# Patient Record
Sex: FEMALE | Race: WHITE
[De-identification: names, ages, dates, MRNs, and addresses within clinical notes are randomized per-mention and may not be internally consistent; named-entity substitution may affect disease eponyms.]

---

## 2020-01-07 ENCOUNTER — HOSPITAL ENCOUNTER (EMERGENCY)
Dept: HOSPITAL 1 - ED | Age: 20
Discharge: HOME | End: 2020-01-07
Payer: SELF-PAY

## 2020-01-07 VITALS
HEIGHT: 64 IN | WEIGHT: 176 LBS | BODY MASS INDEX: 30.05 KG/M2 | HEIGHT: 64 IN | BODY MASS INDEX: 30.05 KG/M2 | WEIGHT: 176 LBS

## 2020-01-07 VITALS — DIASTOLIC BLOOD PRESSURE: 90 MMHG | SYSTOLIC BLOOD PRESSURE: 132 MMHG

## 2020-01-07 DIAGNOSIS — L03.115: ICD-10-CM

## 2020-01-07 DIAGNOSIS — E11.65: Primary | ICD-10-CM

## 2020-01-07 LAB
ALBUMIN SERPL-MCNC: 2.9 G/DL (ref 3.4–5)
ALP SERPL-CCNC: 109 U/L (ref 46–116)
ALT SERPL-CCNC: 26 U/L (ref 14–59)
AST SERPL-CCNC: 14 U/L (ref 15–37)
BASOPHILS NFR BLD: 0.2 % (ref 0–2)
BILIRUB SERPL-MCNC: 0.17 MG/DL (ref 0.2–1)
BUN SERPL-MCNC: 8 MG/DL (ref 7–18)
CALCIUM SERPL-MCNC: 9.5 MG/DL (ref 8.5–10.1)
CHLORIDE SERPL-SCNC: 91 MMOL/L (ref 98–107)
CO2 SERPL-SCNC: 25.7 MMOL/L (ref 21–32)
CREAT SERPL-MCNC: 0.6 MG/DL (ref 0.6–1)
ERYTHROCYTE [DISTWIDTH] IN BLOOD BY AUTOMATED COUNT: 13.4 % (ref 11.5–14.5)
GFR SERPLBLD BASED ON 1.73 SQ M-ARVRAT: > 60 ML/MIN
GLUCOSE SERPL-MCNC: 524 MG/DL (ref 74–106)
PLATELET # BLD: 317 X10^3MCL (ref 130–400)
POTASSIUM SERPL-SCNC: 4.1 MMOL/L (ref 3.5–5.1)
PROT SERPL-MCNC: 8.1 G/DL (ref 6.4–8.2)
SODIUM SERPL-SCNC: 127 MMOL/L (ref 136–145)

## 2020-01-10 ENCOUNTER — HOSPITAL ENCOUNTER (INPATIENT)
Dept: HOSPITAL 1 - ED | Age: 20
LOS: 4 days | Discharge: HOME | DRG: 380 | End: 2020-01-14
Attending: FAMILY MEDICINE | Admitting: FAMILY MEDICINE
Payer: MEDICAID

## 2020-01-10 VITALS
BODY MASS INDEX: 30.27 KG/M2 | HEIGHT: 64 IN | BODY MASS INDEX: 30.27 KG/M2 | WEIGHT: 177.31 LBS | WEIGHT: 177.31 LBS | HEIGHT: 64 IN

## 2020-01-10 VITALS — SYSTOLIC BLOOD PRESSURE: 124 MMHG | DIASTOLIC BLOOD PRESSURE: 84 MMHG

## 2020-01-10 DIAGNOSIS — D68.69: ICD-10-CM

## 2020-01-10 DIAGNOSIS — L02.611: ICD-10-CM

## 2020-01-10 DIAGNOSIS — E11.65: ICD-10-CM

## 2020-01-10 DIAGNOSIS — Z91.14: ICD-10-CM

## 2020-01-10 DIAGNOSIS — E87.1: ICD-10-CM

## 2020-01-10 DIAGNOSIS — E87.8: ICD-10-CM

## 2020-01-10 DIAGNOSIS — E86.0: ICD-10-CM

## 2020-01-10 DIAGNOSIS — Z79.84: ICD-10-CM

## 2020-01-10 DIAGNOSIS — L97.415: ICD-10-CM

## 2020-01-10 DIAGNOSIS — E78.5: ICD-10-CM

## 2020-01-10 DIAGNOSIS — E11.621: Primary | ICD-10-CM

## 2020-01-10 LAB
BASOPHILS NFR BLD: 0.3 % (ref 0–2)
BUN SERPL-MCNC: 12 MG/DL (ref 7–18)
CALCIUM SERPL-MCNC: 8.9 MG/DL (ref 8.5–10.1)
CHLORIDE SERPL-SCNC: 95 MMOL/L (ref 98–107)
CHOLEST SERPL-MCNC: 232 MG/DL (ref ?–200)
CHOLEST/HDLC SERPL: 6.3 MG/DL
CO2 SERPL-SCNC: 24.9 MMOL/L (ref 21–32)
CREAT SERPL-MCNC: 0.6 MG/DL (ref 0.6–1)
ERYTHROCYTE [DISTWIDTH] IN BLOOD BY AUTOMATED COUNT: 12.9 % (ref 11.5–14.5)
GFR SERPLBLD BASED ON 1.73 SQ M-ARVRAT: > 60 ML/MIN
GLUCOSE SERPL-MCNC: 433 MG/DL (ref 74–106)
HDLC SERPL-MCNC: 37 MG/DL (ref 40–60)
MAGNESIUM SERPL-MCNC: 2 MG/DL (ref 1.8–2.4)
PHOSPHATE SERPL-MCNC: 4.4 MG/DL (ref 2.5–4.9)
PLATELET # BLD: 285 X10^3MCL (ref 130–400)
POTASSIUM SERPL-SCNC: 4 MMOL/L (ref 3.5–5.1)
SODIUM SERPL-SCNC: 134 MMOL/L (ref 136–145)
TRIGL SERPL-MCNC: 290 MG/DL (ref ?–150)

## 2020-01-10 PROCEDURE — G0378 HOSPITAL OBSERVATION PER HR: HCPCS

## 2020-01-10 NOTE — NUR
RECEIVED CALL FROM RT FOR PT CXR RESULTS. CXR SHOWING PROMINENT LUCENCY
AROUND THE HEART, WHICH MAY BE ARTIFACT VS PNEUMOMEDIASTINUM, AND RECCOMENDING
CT FOR CLARIFICATION.  RESULTS REPORTED TO DR HUSSEIN. NO NEW ORDERS RECEIVED.
WILL CONT TO MONITOR.

## 2020-01-10 NOTE — NUR
RECEIVED FROM ER, TRANSPORTED VIA [facility]. AWAKE AND ALERT, ORIENTED TO NAME,
PLACE, TIME AND SITUATION. SPEECH CLEAR AND APPROPRIATE. BREATHING EVEN AND
UNLABORED ON ROOM AIR. ABLE TO AMBULATE INTO ROOM, BUT WITH ALTERED MOBILITY
DUE TO SWELLING TO RIGHT FOOT. CRUTCHES WITH PT ON ADMISSION TO UNIT. AREA OF
2.5 X 2 CM AREA OF WHITE SKIN TO PLANTAR AREA OF RIGHT FOOT JUST BELOW 2ND AND
3RD TOES WITH SURROUNDING SKIN RED IN COLOR. +1 EDEMA TO RIGHT FOOT. DENIES
HAVING PAIN AT THIS TIME. PHOTODOCUMENTATION DONE. SALINE LOCK TO LEFT AC.
INSTRUCTED ON USE OF CALL LIGHT TO CALL FOR ASSISTANCE, PLACED WITHIN EASY
REACH.

## 2020-01-10 NOTE — NUR
PT BIB SELF FOR C/O RT FOOT PAAIN. PER PT SHE WAS SEEN IN THE ED THREE DAYS 
AGO FOR A GOOT INFECTION. THE ABCEES WAS DRAINED AND THE PT WAS GIEN AND RX FOR
KEFLEX. PT STS THAT SHE DIDNT HAVE THE MONEY FOR THE RX AND DID NOT TAKE IT
UNTIL YESTERDAAY. PT REPORTS THAT SHE WOKE UP WITH HER RT FOOT LOOKING "
WORSE". SWELLING AND ECCYMOSIS NOTED TO THE RIIGHT FOOT. +2 PTTING EDEMA NOTED.
PT REPORT THAT SHE IS DIABETIC AND HAS NOT BEEN COMPLIANNT WITH HER INSULIKN
AND METFORMN FOR "MONTHS". PT IS A/O X4. PTRESPS ARE E/U. PT PLACED ON MONITOR.
SINUS TACH ON MONITOR

## 2020-01-10 NOTE — NUR
PT MEDICATED PER EMAR ORDERS. PLEASE SEE EMAR. PT IS A/O X4. PT RESPS ARE E/U.
PT DENIES ANY PAIN AT THIS TIME. NO ACDC NOTED

## 2020-01-11 VITALS — DIASTOLIC BLOOD PRESSURE: 86 MMHG | SYSTOLIC BLOOD PRESSURE: 123 MMHG

## 2020-01-11 VITALS — SYSTOLIC BLOOD PRESSURE: 116 MMHG | DIASTOLIC BLOOD PRESSURE: 87 MMHG

## 2020-01-11 VITALS — DIASTOLIC BLOOD PRESSURE: 82 MMHG | SYSTOLIC BLOOD PRESSURE: 119 MMHG

## 2020-01-11 VITALS — SYSTOLIC BLOOD PRESSURE: 118 MMHG | DIASTOLIC BLOOD PRESSURE: 73 MMHG

## 2020-01-11 VITALS — DIASTOLIC BLOOD PRESSURE: 81 MMHG | SYSTOLIC BLOOD PRESSURE: 132 MMHG

## 2020-01-11 LAB
AMPHETAMINES UR QL SCN: (no result)
BASOPHILS NFR BLD: 0.2 % (ref 0–2)
BUN SERPL-MCNC: 8 MG/DL (ref 7–18)
CALCIUM SERPL-MCNC: 8.7 MG/DL (ref 8.5–10.1)
CHLORIDE SERPL-SCNC: 95 MMOL/L (ref 98–107)
CO2 SERPL-SCNC: 23.7 MMOL/L (ref 21–32)
CREAT SERPL-MCNC: 0.5 MG/DL (ref 0.6–1)
ERYTHROCYTE [DISTWIDTH] IN BLOOD BY AUTOMATED COUNT: 13.3 % (ref 11.5–14.5)
GFR SERPLBLD BASED ON 1.73 SQ M-ARVRAT: > 60 ML/MIN
GLUCOSE SERPL-MCNC: 291 MG/DL (ref 74–106)
MAGNESIUM SERPL-MCNC: 1.8 MG/DL (ref 1.8–2.4)
MICROSCOPIC UR-IMP: YES
PHOSPHATE SERPL-MCNC: 4.1 MG/DL (ref 2.5–4.9)
PLATELET # BLD: 258 X10^3MCL (ref 130–400)
POTASSIUM SERPL-SCNC: 4.2 MMOL/L (ref 3.5–5.1)
RBC # UR STRIP.AUTO: (no result) /UL
SODIUM SERPL-SCNC: 129 MMOL/L (ref 136–145)
T3 SERPL-MCNC: 0.58 NG/ML
T3RU NFR SERPL: 38 % UPTAKE (ref 30–39)
T4 FREE SERPL-MCNC: 1.26 NG/DL (ref 0.76–1.46)
T4 SERPL-MCNC: 7 UG/DL (ref 4.7–13.3)
T4/T3 UPTAKE INDEX SERPL: 2.7 UG/DL (ref 1.4–4.5)
UA SPECIFIC GRAVITY: 1.02 (ref 1–1.03)

## 2020-01-11 PROCEDURE — 0JBQ0ZZ EXCISION OF RIGHT FOOT SUBCUTANEOUS TISSUE AND FASCIA, OPEN APPROACH: ICD-10-PCS | Performed by: PODIATRIST

## 2020-01-11 NOTE — NUR
SEEN IN BED AAOX4. NO RESP DISTRESS, BREATHING E/U ON ROOM AIR. DRSG TO RIGHT
FOOT WRAPPED WITH ACE, CDI, ELEVATED ON PILLOWS. STATED PAIN IS TOLERABLE. ON
CCHO DIET. S/L TO LFA INTACT AND PATENT. ENCOURAGED TO AMBULATE USING CRUTCHES
WITH RIGHT HEEL WEIGHT BEARING WITH POST-OP SHOE. PATIENT VERBALIZED
UNDERSTANDING. CALL LIGHT PLACED WITHIN EASY REACH. SIDERAILS UP X2.

## 2020-01-11 NOTE — NUR
PT SLEPT AT INTERVALS THROUGHOUT THE NIGHT, BREATHING EVEN AND UNLABORED ON
RA. NO SIGNIFICANT CHANGE DURING SHIFT. NO C/O PAIN. ALL NEEDS ASSESSED AND
ATTENDED TO. NO ACUTE DISTRESS NOTED.  BED AT LOWEST SETTING. SIDE RAILS X2
UP. CALL LIGHT WITHING REACH. WILL CONT TO MONITOR AND ENDORSE CARE TO AM
NURSE.

## 2020-01-11 NOTE — NUR
PT HAD I AND D PER DR GONZALEZ, CONSENT WAS SIGNED AND PUT IN CHART. PT
TOLERATED PROCEDURE WELL. STATES "I FEEL MUCH BETTER". NO ACUTE DISTRESS
NOTED.  PICTURE AFTER PROCEDURE TAKEN AND PUT ON CHART. SAFETY PRECAUTIONS IN
PLACE. WILL ENDORSE CARE TO AM NURSE.

## 2020-01-11 NOTE — NUR
awake and alert, using her cellphone, breathing even and unlabored on room
air. call light within easy reach. saline lock to left forearm flushed well,
due zosyn ivpb administered.

## 2020-01-11 NOTE — NUR
NORCO 1 TAB PO GIVEN FOR RIGHT FOOT INCISION SITE PAIN, WILL CONTINUE TO
MONITOR. KEPT RLE ELEVATED ON 2 PILLOWS.

## 2020-01-11 NOTE — NUR
AWAKE AND ALERT, ORIENTED TO NAME, PLACE, TIME AND SITUATION. SPEECH CLEAR AND
APPROPRIATE. BREATHING EVEN AND UNLABORED ON ROOM AIR. DENIES HAVING PAIN TO
RIGHT FOOT AT THIS TIME. AMBULATORY WITH POST OP BOOT AND CRUTCHES. DRESSING
TO RIGHT FOOT CDI. ELEVATED ON 2 PILLOWS. CALL LIGHT WITHIN EASY REACH.

## 2020-01-12 VITALS — DIASTOLIC BLOOD PRESSURE: 80 MMHG | SYSTOLIC BLOOD PRESSURE: 127 MMHG

## 2020-01-12 VITALS — DIASTOLIC BLOOD PRESSURE: 88 MMHG | SYSTOLIC BLOOD PRESSURE: 131 MMHG

## 2020-01-12 VITALS — SYSTOLIC BLOOD PRESSURE: 129 MMHG | DIASTOLIC BLOOD PRESSURE: 88 MMHG

## 2020-01-12 VITALS — DIASTOLIC BLOOD PRESSURE: 83 MMHG | SYSTOLIC BLOOD PRESSURE: 127 MMHG

## 2020-01-12 VITALS — DIASTOLIC BLOOD PRESSURE: 81 MMHG | SYSTOLIC BLOOD PRESSURE: 130 MMHG

## 2020-01-12 LAB
BASOPHILS NFR BLD: 0.3 % (ref 0–2)
BUN SERPL-MCNC: 9 MG/DL (ref 7–18)
CALCIUM SERPL-MCNC: 8.3 MG/DL (ref 8.5–10.1)
CHLORIDE SERPL-SCNC: 102 MMOL/L (ref 98–107)
CO2 SERPL-SCNC: 24.5 MMOL/L (ref 21–32)
CREAT SERPL-MCNC: 0.5 MG/DL (ref 0.6–1)
ERYTHROCYTE [DISTWIDTH] IN BLOOD BY AUTOMATED COUNT: 13 % (ref 11.5–14.5)
GFR SERPLBLD BASED ON 1.73 SQ M-ARVRAT: > 60 ML/MIN
GLUCOSE SERPL-MCNC: 264 MG/DL (ref 74–106)
MAGNESIUM SERPL-MCNC: 1.7 MG/DL (ref 1.8–2.4)
PHOSPHATE SERPL-MCNC: 4.3 MG/DL (ref 2.5–4.9)
PLATELET # BLD: 274 X10^3MCL (ref 130–400)
POTASSIUM SERPL-SCNC: 4.2 MMOL/L (ref 3.5–5.1)
SODIUM SERPL-SCNC: 138 MMOL/L (ref 136–145)

## 2020-01-12 PROCEDURE — 0JBQ0ZZ EXCISION OF RIGHT FOOT SUBCUTANEOUS TISSUE AND FASCIA, OPEN APPROACH: ICD-10-PCS | Performed by: PODIATRIST

## 2020-01-12 NOTE — NUR
AWAKE AND ALERT, WATCHING TV. BREATHING EVEN AND UNLABORED ON ROOM AIR. SALINE
LOCKED. DENIES HAVING PAIN. RIGHT FOOT ELEVATED ON 2PILLOWS. CALL LIGHT WITHIN
EASY REACH. OFF ISOLATION.

## 2020-01-12 NOTE — NUR
PT BACK FROM OR. NO ACUTE DISTRESS. AAOX4. RESP EVEN AND UNLABORED ON RA.
DENIES PAIN AT THIS TIME. DRESSING TO RIGHT FOOT C/D/I. RLE ELEVATED WITH
PILLOW. IV TO LFA WITH NO REDNESS OR SWELLING. HOB ELEVATED. CALL LIGHT WITHIN
REACH. FAMILY AT BEDSIDE. WILL CONTINUE TO MONITOR.

## 2020-01-12 NOTE — NUR
RECEIVED PT IN NO ACUTE DISTRESS. RESTING IN BED. AAOX4. GETTING DRESSING
CHANGE TO RIGHT FOOT BY PODIATRY. DENIES PAIN. IV TO LFA, NO REDNESS OR
SWELLING NOTED. CRUTCHES AND POST-OP SHOE AT BEDSIDE. BED IN LOW POSITION,
CALL LIGHT WITHIN REACH. WILL CONTINUE TO MONITOR.

## 2020-01-12 NOTE — NUR
AWAKE AND ALERT, DRESSING TO RIGHT FOOT WAS REMOVED BY PODIATRIST. IN NO ACUTE
DISTRESS. ENDORSED TO NURSE KHAN

## 2020-01-12 NOTE — NUR
EYES CLOSED, EASILY AWAKENED. DENIES HAVING PAIN. BREATHING EVEN AND UNLABORED
ON ROOM AIR. KEPT ON CONTACT ISOLATION. DRESSING TO RIGHT FOOT CDI. KEPT
ELEVATED ON PILLOWS.

## 2020-01-12 NOTE — NUR
PT SITTING UP IN BED. NO ACUTE DISTRESS. AAOX4. RESP EVEN AND UNLABORED ON RA.
EATING DINNER. IV TO LFA, NO REDNESS OR SWELLING NOTED. DRESSING TO R FOOT
C/D/I, RLE ELEVATED WITH PILLOW. CRUTCHES AND POST OP SHOE AT BEDSIDE. PT NOW
ON STANDARD PRECAUTIONS. BED IN LOW POSITION, CALL LIGHT WITHIN REACH. WILL
ENDORSE TO ONCOMING SHIFT.

## 2020-01-12 NOTE — NUR
DR. CHATTERJEE SPOKE WITH PT REGARDING PROCEDURE, RISKS AND BENEFITS.
INFORMED CONSENT SIGNED AND PLACED IN CHART. PRE-OP PREP DONE. PT MARKED
SURGICAL SITE WITH SURGICAL MARKER. PT REPORTS LAST DRINK OF WATER AROUND
6 AM. PT INFORMED OF NPO STATUS, VERBALIZED UNDERSTANDING. WILL CONTINUE TO
MONITOR.

## 2020-01-13 VITALS — SYSTOLIC BLOOD PRESSURE: 120 MMHG | DIASTOLIC BLOOD PRESSURE: 84 MMHG

## 2020-01-13 VITALS — SYSTOLIC BLOOD PRESSURE: 131 MMHG | DIASTOLIC BLOOD PRESSURE: 78 MMHG

## 2020-01-13 VITALS — DIASTOLIC BLOOD PRESSURE: 87 MMHG | SYSTOLIC BLOOD PRESSURE: 126 MMHG

## 2020-01-13 VITALS — DIASTOLIC BLOOD PRESSURE: 76 MMHG | SYSTOLIC BLOOD PRESSURE: 116 MMHG

## 2020-01-13 VITALS — DIASTOLIC BLOOD PRESSURE: 81 MMHG | SYSTOLIC BLOOD PRESSURE: 116 MMHG

## 2020-01-13 LAB
BASOPHILS NFR BLD: 0.4 % (ref 0–2)
BUN SERPL-MCNC: 8 MG/DL (ref 7–18)
CALCIUM SERPL-MCNC: 8.6 MG/DL (ref 8.5–10.1)
CHLORIDE SERPL-SCNC: 100 MMOL/L (ref 98–107)
CO2 SERPL-SCNC: 28.6 MMOL/L (ref 21–32)
CREAT SERPL-MCNC: 0.5 MG/DL (ref 0.6–1)
ERYTHROCYTE [DISTWIDTH] IN BLOOD BY AUTOMATED COUNT: 13.1 % (ref 11.5–14.5)
GFR SERPLBLD BASED ON 1.73 SQ M-ARVRAT: > 60 ML/MIN
GLUCOSE SERPL-MCNC: 151 MG/DL (ref 74–106)
MAGNESIUM SERPL-MCNC: 2 MG/DL (ref 1.8–2.4)
PLATELET # BLD: 300 X10^3MCL (ref 130–400)
POTASSIUM SERPL-SCNC: 3.8 MMOL/L (ref 3.5–5.1)
SODIUM SERPL-SCNC: 134 MMOL/L (ref 136–145)

## 2020-01-13 NOTE — NUR
PT RESTING QUIETLY. BREATH SOUNDS CLEAR JUN. 02 SAT 97% ON RA. ABD SOFT WITH
AUDIBLE [id number]. PT HAD BM TODAY, SOFT AND FORMED. PT DENIES ANY PAIN. MED
GIVEN AS ORDERED. CALL LIGHT WITHIN REACHED.

## 2020-01-13 NOTE — NUR
I HAVE REVIEWED THE DATA COLLECTION BY RN ORIENTEE (NAME):HUY ESPINOZA RN
ENTERED ON (DATE/TIME):1/13/20 @ 1025
 
I CONCUR WITH THE DATA AND ANY EXCEPTIONS OR COMMENTS ARE LISTED BELOW:

## 2020-01-13 NOTE — NUR
PT AWAKE AND ALERT. SITTING UP ON THE SIDE OF BED EATING LUNCH. NO APPARENT
DISTRESS NOTED. WILL CONTINUE TO MONITOR

## 2020-01-13 NOTE — NUR
AT 0710 RECEIVED PT FROM NIGHT RN. CLAUDINE4. RESPIRATIONS E/U. NON-TELE. DRESSING
TO RLE ELEVATED ON PILLOW. CRUTCHES AND POST-OP SHOE AT BEDSIDE. IV SITE SL
TO [facility]. FAMILY AT BEDSIDE. CALL LIGHT WITHIN REACH. BED IN LOWEST POSITION.
AT 0730 PODIATRIST CLEANED AND CHANGED DRESSING TO RLE.
AT 0830 C/O 8/10 PAIN ON RLE. GIVEN NORCO AS PER EMAR.

## 2020-01-14 VITALS — DIASTOLIC BLOOD PRESSURE: 74 MMHG | SYSTOLIC BLOOD PRESSURE: 112 MMHG

## 2020-01-14 VITALS — DIASTOLIC BLOOD PRESSURE: 72 MMHG | SYSTOLIC BLOOD PRESSURE: 130 MMHG

## 2020-01-14 VITALS — SYSTOLIC BLOOD PRESSURE: 127 MMHG | DIASTOLIC BLOOD PRESSURE: 91 MMHG

## 2020-01-14 VITALS — DIASTOLIC BLOOD PRESSURE: 84 MMHG | SYSTOLIC BLOOD PRESSURE: 116 MMHG

## 2020-01-14 LAB
BASOPHILS NFR BLD: 0.6 % (ref 0–2)
BUN SERPL-MCNC: 8 MG/DL (ref 7–18)
CALCIUM SERPL-MCNC: 8.6 MG/DL (ref 8.5–10.1)
CHLORIDE SERPL-SCNC: 104 MMOL/L (ref 98–107)
CO2 SERPL-SCNC: 29.6 MMOL/L (ref 21–32)
CREAT SERPL-MCNC: 0.5 MG/DL (ref 0.6–1)
ERYTHROCYTE [DISTWIDTH] IN BLOOD BY AUTOMATED COUNT: 13.4 % (ref 11.5–14.5)
GFR SERPLBLD BASED ON 1.73 SQ M-ARVRAT: > 60 ML/MIN
GLUCOSE SERPL-MCNC: 170 MG/DL (ref 74–106)
PLATELET # BLD: 358 X10^3MCL (ref 130–400)
POTASSIUM SERPL-SCNC: 3.1 MMOL/L (ref 3.5–5.1)
SODIUM SERPL-SCNC: 140 MMOL/L (ref 136–145)

## 2020-01-14 NOTE — NUR
PT STATED PAIN IS BETTER AND PAIN SCALE IS DOWN TO 5/10, RESTING QUIETLY. IV
SITE ON RT F/A, INTACT AND ZOSYN INFUSING WELL, HL SITE NO REDNESS NOTED.
FLUSHED AFTER ZOSYN WAS DONE. CALL LIGHT WITHIN REACHED.

## 2020-01-14 NOTE — NUR
DRESSING CHANGE PROVIDED BY PATIENT. PHOTO ATTEMPTED TO TAKE, BUT LOW BATTERY
ON CAMERA AND PATIENT NEEDED TO LEAVE SOON SINCE HER RIDE WAS HERE.

## 2020-01-14 NOTE — NUR
PT STILL AWAKE. RESP EVEN AND UNLABORED. PT DENIES ANY PAIN. RT FOOT DRSG
C&D&I, AND ELEVATED ON 2 PILLOWS. ZOSYN IVPB AS ORDERD, HL ON LT FA INTACT,
IVPB INFUSING WELL, SITE CLEAR. CALL LIGHT WITHIN REACHED.

## 2020-01-14 NOTE — NUR
PT COMPLAINING OF RT FOOT PAIN AND REQUESTING PAIN MED. REQUESTING NORCO AND
GIVEN. PAIN 8/10./74. RT FOOT DRSG INTACT WITH 2+ POST TIB PULSE
PRESENT. RT LEG CONT ELEVATED ON 2 PILLOWS. HL ON R/A INTACT. CALL LIGHT WTHIN
REACHED.